# Patient Record
Sex: FEMALE
[De-identification: names, ages, dates, MRNs, and addresses within clinical notes are randomized per-mention and may not be internally consistent; named-entity substitution may affect disease eponyms.]

---

## 2021-06-29 ENCOUNTER — NON-APPOINTMENT (OUTPATIENT)
Age: 36
End: 2021-06-29

## 2021-06-30 PROBLEM — Z00.00 ENCOUNTER FOR PREVENTIVE HEALTH EXAMINATION: Status: ACTIVE | Noted: 2021-06-30

## 2021-07-09 ENCOUNTER — NON-APPOINTMENT (OUTPATIENT)
Age: 36
End: 2021-07-09

## 2021-07-12 ENCOUNTER — APPOINTMENT (OUTPATIENT)
Dept: BREAST CENTER | Facility: CLINIC | Age: 36
End: 2021-07-12
Payer: COMMERCIAL

## 2021-07-12 VITALS — HEART RATE: 65 BPM | BODY MASS INDEX: 23.14 KG/M2 | WEIGHT: 144 LBS | HEIGHT: 66 IN | OXYGEN SATURATION: 98 %

## 2021-07-12 DIAGNOSIS — Z78.9 OTHER SPECIFIED HEALTH STATUS: ICD-10-CM

## 2021-07-12 DIAGNOSIS — R92.8 OTHER ABNORMAL AND INCONCLUSIVE FINDINGS ON DIAGNOSTIC IMAGING OF BREAST: ICD-10-CM

## 2021-07-12 DIAGNOSIS — Z86.59 PERSONAL HISTORY OF OTHER MENTAL AND BEHAVIORAL DISORDERS: ICD-10-CM

## 2021-07-12 PROCEDURE — 99072 ADDL SUPL MATRL&STAF TM PHE: CPT

## 2021-07-12 PROCEDURE — 99203 OFFICE O/P NEW LOW 30 MIN: CPT

## 2021-07-12 RX ORDER — MULTIVIT-MIN/IRON/FOLIC ACID/K 18-600-40
CAPSULE ORAL
Refills: 0 | Status: ACTIVE | COMMUNITY

## 2021-07-13 NOTE — HISTORY OF PRESENT ILLNESS
[FreeTextEntry1] : 36 year old female who presents for initial evaluation regarding a family history of breast and ovarian cancer. Her mother was diagnosed with breast cancer at age 38 with a recurrence at age 58 and paternal grandmother diagnosed with ovarian cancer. Denies any breast pain or palpable abnormalities today. \par \par SCARLETT lifetime risk of 29.6%\par \par She underwent BRCA genetic testing with reportedly negative results in 03/2018. Patient is not aware if she had full panel testing. Will obtain those results and send them to us.\par \par Discussed patients high risk status and recommendations for close surveillance. Patient understands and would like to be followed closely.\par \par Discussed benefits of surveillance and well as implication of the sensitivity of breast MRI. Patient understands and agrees to go forward with MRI for breast cancer surveillance.\par \par Discussed self breast exams with the patient. Recommended simple pattern of palpation, while seated and while laying down. Recommended that she performs these breast exams at the same time every month, as to time it with her cycle. Discussed characteristics of a suspicious mass, such as irregular, hard like a rock and fixed/immobile. Patient understands.\par

## 2021-07-13 NOTE — DATA REVIEWED
[FreeTextEntry1] : 7/24/2020 ([facility]) MRI breasts: Bilateral nipple inversion, concordant with the stable mmg appearance of the breasts bilaterally. Suggestion of subcutaneous nonenhancing right axillary breast tissue. Right breast US is recommended directed to the right axilla for complete evaluation of aprtially included nonenhancing right axillary breast tissue; additional mgm views are also recommended. BI-RADS 0, right breast. BI-RADS 2 left breast. \par \par 8/13/2020 ([facility]) right breast mmg: No underlying mass or calcs identified. BI-RADS 2. \par \par 8/13/2020 ([facility]) targeted right breast US: Benign tissue. BI-RADS 2. \par \par 1/26/21 ([facility]) screening mmg: extremely dense. new calcs in the upper outer right breast. Rec 6-month f/u abreast MRI and addition right breast mmg views. BI-RADS 0.\par \par 1/26/21 ([facility]) b/l breast US: No suspicious findings.BI-RADS 2. 6-month f/u MRI is recommended. \par \par 3/1/21 ([facility]) right breast dx mmg: probably benign calcs within the anterior breast at 12:00, rec short interval f/u in 6 months w. mmg. BI-RADS 3.

## 2021-07-13 NOTE — PAST MEDICAL HISTORY
[Menstruating] : The patient is menstruating [Menarche Age ____] : age at menarche was [unfilled] [Definite ___ (Date)] : the last menstrual period was [unfilled] [Regular Cycle Intervals] : have been regular [Total Preg ___] : G[unfilled] [History of Hormone Replacement Treatment] : has no history of hormone replacement treatment [FreeTextEntry5] : None [FreeTextEntry6] : None [FreeTextEntry7] : None [FreeTextEntry8] : None

## 2021-07-19 ENCOUNTER — TRANSCRIPTION ENCOUNTER (OUTPATIENT)
Age: 36
End: 2021-07-19

## 2021-07-20 ENCOUNTER — RESULT REVIEW (OUTPATIENT)
Age: 36
End: 2021-07-20

## 2021-07-20 ENCOUNTER — APPOINTMENT (OUTPATIENT)
Dept: MRI IMAGING | Facility: CLINIC | Age: 36
End: 2021-07-20
Payer: COMMERCIAL

## 2021-07-20 ENCOUNTER — OUTPATIENT (OUTPATIENT)
Dept: OUTPATIENT SERVICES | Facility: HOSPITAL | Age: 36
LOS: 1 days | End: 2021-07-20

## 2021-07-20 PROCEDURE — 77049 MRI BREAST C-+ W/CAD BI: CPT | Mod: 26

## 2021-07-23 ENCOUNTER — NON-APPOINTMENT (OUTPATIENT)
Age: 36
End: 2021-07-23

## 2021-08-02 ENCOUNTER — NON-APPOINTMENT (OUTPATIENT)
Age: 36
End: 2021-08-02

## 2021-08-19 ENCOUNTER — NON-APPOINTMENT (OUTPATIENT)
Age: 36
End: 2021-08-19

## 2021-09-15 ENCOUNTER — APPOINTMENT (OUTPATIENT)
Dept: MAMMOGRAPHY | Facility: CLINIC | Age: 36
End: 2021-09-15
Payer: COMMERCIAL

## 2021-09-15 ENCOUNTER — OUTPATIENT (OUTPATIENT)
Dept: OUTPATIENT SERVICES | Facility: HOSPITAL | Age: 36
LOS: 1 days | End: 2021-09-15

## 2021-09-15 ENCOUNTER — RESULT REVIEW (OUTPATIENT)
Age: 36
End: 2021-09-15

## 2021-09-15 PROCEDURE — 77065 DX MAMMO INCL CAD UNI: CPT | Mod: 26,RT

## 2021-09-15 PROCEDURE — G0279: CPT | Mod: 26

## 2021-09-17 ENCOUNTER — NON-APPOINTMENT (OUTPATIENT)
Age: 36
End: 2021-09-17

## 2021-12-17 ENCOUNTER — APPOINTMENT (OUTPATIENT)
Dept: BREAST CENTER | Facility: CLINIC | Age: 36
End: 2021-12-17
Payer: COMMERCIAL

## 2021-12-17 VITALS — OXYGEN SATURATION: 98 % | HEART RATE: 71 BPM | HEIGHT: 66 IN

## 2021-12-17 PROCEDURE — 99213 OFFICE O/P EST LOW 20 MIN: CPT

## 2021-12-21 NOTE — DATA REVIEWED
[FreeTextEntry1] : 7/24/2020 ([facility]) MRI breasts: Bilateral nipple inversion, concordant with the stable mmg appearance of the breasts bilaterally. Suggestion of subcutaneous nonenhancing right axillary breast tissue. Right breast US is recommended directed to the right axilla for complete evaluation of aprtially included nonenhancing right axillary breast tissue; additional mgm views are also recommended. BI-RADS 0, right breast. BI-RADS 2 left breast. \par \par 8/13/2020 ([facility]) right breast mmg: No underlying mass or calcs identified. BI-RADS 2. \par \par 8/13/2020 ([facility]) targeted right breast US: Benign tissue. BI-RADS 2. \par \par 1/26/21 ([facility]) screening mmg: extremely dense. new calcs in the upper outer right breast. Rec 6-month f/u abreast MRI and addition right breast mmg views. BI-RADS 0.\par \par 1/26/21 ([facility]) b/l breast US: No suspicious findings.BI-RADS 2. 6-month f/u MRI is recommended. \par \par 3/1/21 ([facility]) right breast dx mmg: probably benign calcs within the anterior breast at 12:00, rec short interval f/u in 6 months w. mmg. BI-RADS 3. \par \par 7/10/21 ([facility]) MRI breasts: no suspicious enhancement. BI-RADS 1. \par \par 9/15/21 ([facility]) right dx mg: extremely dense. No e/o malignancy. BI-RADS 2.

## 2021-12-21 NOTE — ASSESSMENT
[FreeTextEntry1] : 37 yo female presents for high risk screening; no breast complaints today and her exam is WNL. Discussed with the patient the implications for their lifetime risk, which is considered to be elevated for the development of breast cancer over the span of their lifetime. It was explained that it is recommended that they undergo high risk screening surveillance to include biannual radiological screening exams with a mammogram and screening MRI. Reviewed the standard lifestyle modifications for risk reduction including to limit alcohol intake, follow a low-fat diet, and maintaining a healthy weight.

## 2021-12-21 NOTE — HISTORY OF PRESENT ILLNESS
[FreeTextEntry1] : 36 year old female who presents for high risk screening; Pt denies palpable masses, skin lesions/changes, nipple discharge, or other breast complaints. \par \par SCARLETT lifetime risk of 29.6% the patients mother was diagnosed with breast cancer at age 38 with a recurrence at age 58 and paternal grandmother diagnosed with ovarian cancer. Genetic testing was negative in August 2021.

## 2021-12-21 NOTE — PAST MEDICAL HISTORY
[Menstruating] : The patient is menstruating [Menarche Age ____] : age at menarche was [unfilled] [Regular Cycle Intervals] : have been regular [Total Preg ___] : G[unfilled] [History of Hormone Replacement Treatment] : has no history of hormone replacement treatment [FreeTextEntry5] : None [FreeTextEntry6] : None [FreeTextEntry7] : NuvaRing [FreeTextEntry8] : None

## 2022-01-04 ENCOUNTER — TRANSCRIPTION ENCOUNTER (OUTPATIENT)
Age: 37
End: 2022-01-04

## 2022-02-08 ENCOUNTER — RESULT REVIEW (OUTPATIENT)
Age: 37
End: 2022-02-08

## 2022-02-08 ENCOUNTER — APPOINTMENT (OUTPATIENT)
Dept: MAMMOGRAPHY | Facility: CLINIC | Age: 37
End: 2022-02-08
Payer: COMMERCIAL

## 2022-02-08 ENCOUNTER — OUTPATIENT (OUTPATIENT)
Dept: OUTPATIENT SERVICES | Facility: HOSPITAL | Age: 37
LOS: 1 days | End: 2022-02-08

## 2022-02-08 ENCOUNTER — APPOINTMENT (OUTPATIENT)
Dept: ULTRASOUND IMAGING | Facility: CLINIC | Age: 37
End: 2022-02-08
Payer: COMMERCIAL

## 2022-02-08 PROCEDURE — 76641 ULTRASOUND BREAST COMPLETE: CPT | Mod: 26,50

## 2022-02-08 PROCEDURE — 77063 BREAST TOMOSYNTHESIS BI: CPT | Mod: 26

## 2022-02-08 PROCEDURE — 77067 SCR MAMMO BI INCL CAD: CPT | Mod: 26

## 2022-02-10 ENCOUNTER — NON-APPOINTMENT (OUTPATIENT)
Age: 37
End: 2022-02-10

## 2022-05-03 ENCOUNTER — NON-APPOINTMENT (OUTPATIENT)
Age: 37
End: 2022-05-03

## 2022-07-12 ENCOUNTER — APPOINTMENT (OUTPATIENT)
Dept: MRI IMAGING | Facility: CLINIC | Age: 37
End: 2022-07-12

## 2022-07-12 ENCOUNTER — RESULT REVIEW (OUTPATIENT)
Age: 37
End: 2022-07-12

## 2022-07-12 ENCOUNTER — OUTPATIENT (OUTPATIENT)
Dept: OUTPATIENT SERVICES | Facility: HOSPITAL | Age: 37
LOS: 1 days | End: 2022-07-12

## 2022-07-12 PROCEDURE — 77049 MRI BREAST C-+ W/CAD BI: CPT | Mod: 26

## 2022-07-29 ENCOUNTER — APPOINTMENT (OUTPATIENT)
Dept: BREAST CENTER | Facility: CLINIC | Age: 37
End: 2022-07-29

## 2022-07-29 VITALS — HEIGHT: 66 IN | WEIGHT: 138 LBS | BODY MASS INDEX: 22.18 KG/M2 | OXYGEN SATURATION: 98 % | HEART RATE: 90 BPM

## 2022-07-29 DIAGNOSIS — Z80.41 FAMILY HISTORY OF MALIGNANT NEOPLASM OF OVARY: ICD-10-CM

## 2022-07-29 PROCEDURE — 99213 OFFICE O/P EST LOW 20 MIN: CPT

## 2022-07-29 RX ORDER — ETONOGESTREL AND ETHINYL ESTRADIOL .12; .015 MG/D; MG/D
0.12-0.015 INSERT, EXTENDED RELEASE VAGINAL
Qty: 3 | Refills: 0 | Status: ACTIVE | COMMUNITY
Start: 2022-07-06

## 2022-07-29 RX ORDER — UBIDECARENONE/VIT E ACET 100MG-5
CAPSULE ORAL
Refills: 0 | Status: ACTIVE | COMMUNITY

## 2022-07-29 RX ORDER — BIOTIN 10 MG
TABLET ORAL
Refills: 0 | Status: DISCONTINUED | COMMUNITY
End: 2022-07-29

## 2022-07-29 RX ORDER — CETIRIZINE HYDROCHLORIDE 10 MG/1
CAPSULE, LIQUID FILLED ORAL
Refills: 0 | Status: ACTIVE | COMMUNITY

## 2022-07-29 NOTE — PAST MEDICAL HISTORY
[Menstruating] : The patient is menstruating [Menarche Age ____] : age at menarche was [unfilled] [Regular Cycle Intervals] : have been regular [Total Preg ___] : G[unfilled] [Definite ___ (Date)] : the last menstrual period was [unfilled] [History of Hormone Replacement Treatment] : has no history of hormone replacement treatment [FreeTextEntry5] : None [FreeTextEntry6] : None [FreeTextEntry7] : NuvaRing [FreeTextEntry8] : None

## 2022-07-29 NOTE — PHYSICAL EXAM
[Normocephalic] : normocephalic [Atraumatic] : atraumatic [Supple] : supple [No Supraclavicular Adenopathy] : no supraclavicular adenopathy [Examined in the supine and seated position] : examined in the supine and seated position [Symmetrical] : symmetrical [No dominant masses] : no dominant masses left breast [No Nipple Retraction] : no left nipple retraction [No Nipple Discharge] : no left nipple discharge [No Axillary Lymphadenopathy] : no left axillary lymphadenopathy [No Edema] : no edema [No Rashes] : no rashes [No Ulceration] : no ulceration [Breast Nipple Inversion] : nipples not inverted [Breast Nipple Retraction] : nipples not retracted [Breast Nipple Flattening] : nipples not flattened [Breast Nipple Fissures] : nipples not fissured [de-identified] : right breast, 12:00 3cmFN, palpable density, mobile, approx 2cm, new from prior exam  [de-identified] : 1

## 2022-07-29 NOTE — HISTORY OF PRESENT ILLNESS
[FreeTextEntry1] : 37 year old female who presents for high risk screening; Pt denies palpable masses, skin lesions/changes, nipple discharge, or other breast complaints. \par \par SCARLETT lifetime risk of 29.6% the patients mother was diagnosed with breast cancer at age 38 with a recurrence at age 58 and paternal grandmother diagnosed with ovarian cancer. Genetic testing was negative in August 2021.

## 2022-07-29 NOTE — ASSESSMENT
[FreeTextEntry1] : 35 yo female presents for high risk screening; no breast complaints today; Exam is notable for palpable density at 12:00in her right breast. WIll have her proceed with diagnostic imaging. Discussed with the patient the implications for their lifetime risk, which is considered to be elevated for the development of breast cancer over the span of their lifetime. It was explained that it is recommended that they undergo high risk screening surveillance to include biannual radiological screening exams with a mammogram and screening MRI. \par \par Will have her proceed with a right diagnostic mammo and US to palpable density; if benign, she will be due for her routine high risk screening mammo & US in February & will RTC after. The patient verbalized understanding and is in agreement with the plan.

## 2022-07-29 NOTE — DATA REVIEWED
[FreeTextEntry1] : 7/24/2020 ([facility]) MRI breasts: Bilateral nipple inversion, concordant with the stable mmg appearance of the breasts bilaterally. Suggestion of subcutaneous nonenhancing right axillary breast tissue. Right breast US is recommended directed to the right axilla for complete evaluation of partially included nonenhancing right axillary breast tissue; additional mgm views are also recommended. BI-RADS 0, right breast. BI-RADS 2 left breast. \par \par 8/13/2020 ([facility]) right breast mmg: No underlying mass or calcs identified. BI-RADS 2. \par \par 8/13/2020 ([facility]) targeted right breast US: Benign tissue. BI-RADS 2. \par \par 1/26/21 ([facility]) screening mmg: extremely dense. new calcs in the upper outer right breast. Rec 6-month f/u abreast MRI and addition right breast mmg views. BI-RADS 0.\par \par 1/26/21 ([facility]) b/l breast US: No suspicious findings.BI-RADS 2. 6-month f/u MRI is recommended. \par \par 3/1/21 ([facility]) right breast dx mmg: probably benign calcs within the anterior breast at 12:00, rec short interval f/u in 6 months w. mmg. BI-RADS 3. \par \par 7/10/21 ([facility]) MRI breasts: no suspicious enhancement. BI-RADS 1. \par \par 9/15/21 ([facility]) right dx mg: extremely dense. No e/o malignancy. BI-RADS 2. \par \par 2/8/22 ([facility]) b/l mmg & US: extremely dense. No mammographic/sonographic evidence of malignancy. BI-RADS 2. \par \par 7/12/22 ([facility]) MRI: No e/o malignancy. BI-RADS 2.

## 2022-08-10 ENCOUNTER — RESULT REVIEW (OUTPATIENT)
Age: 37
End: 2022-08-10

## 2022-08-10 ENCOUNTER — APPOINTMENT (OUTPATIENT)
Dept: MAMMOGRAPHY | Facility: CLINIC | Age: 37
End: 2022-08-10

## 2022-08-10 ENCOUNTER — APPOINTMENT (OUTPATIENT)
Dept: ULTRASOUND IMAGING | Facility: CLINIC | Age: 37
End: 2022-08-10

## 2022-08-10 ENCOUNTER — OUTPATIENT (OUTPATIENT)
Dept: OUTPATIENT SERVICES | Facility: HOSPITAL | Age: 37
LOS: 1 days | End: 2022-08-10

## 2022-08-10 PROCEDURE — 76642 ULTRASOUND BREAST LIMITED: CPT | Mod: 26,RT

## 2022-08-10 PROCEDURE — G0279: CPT | Mod: 26

## 2022-08-10 PROCEDURE — 77065 DX MAMMO INCL CAD UNI: CPT | Mod: 26,RT

## 2022-08-11 ENCOUNTER — TRANSCRIPTION ENCOUNTER (OUTPATIENT)
Age: 37
End: 2022-08-11

## 2022-08-16 ENCOUNTER — TRANSCRIPTION ENCOUNTER (OUTPATIENT)
Age: 37
End: 2022-08-16

## 2023-02-14 ENCOUNTER — TRANSCRIPTION ENCOUNTER (OUTPATIENT)
Age: 38
End: 2023-02-14

## 2023-02-16 ENCOUNTER — OUTPATIENT (OUTPATIENT)
Dept: OUTPATIENT SERVICES | Facility: HOSPITAL | Age: 38
LOS: 1 days | End: 2023-02-16

## 2023-02-16 ENCOUNTER — APPOINTMENT (OUTPATIENT)
Dept: ULTRASOUND IMAGING | Facility: CLINIC | Age: 38
End: 2023-02-16
Payer: COMMERCIAL

## 2023-02-16 ENCOUNTER — RESULT REVIEW (OUTPATIENT)
Age: 38
End: 2023-02-16

## 2023-02-16 ENCOUNTER — TRANSCRIPTION ENCOUNTER (OUTPATIENT)
Age: 38
End: 2023-02-16

## 2023-02-16 ENCOUNTER — APPOINTMENT (OUTPATIENT)
Dept: MAMMOGRAPHY | Facility: CLINIC | Age: 38
End: 2023-02-16
Payer: COMMERCIAL

## 2023-02-16 PROCEDURE — 76641 ULTRASOUND BREAST COMPLETE: CPT | Mod: 26,50

## 2023-02-16 PROCEDURE — 77063 BREAST TOMOSYNTHESIS BI: CPT | Mod: 26

## 2023-02-16 PROCEDURE — 77067 SCR MAMMO BI INCL CAD: CPT | Mod: 26

## 2023-02-27 ENCOUNTER — NON-APPOINTMENT (OUTPATIENT)
Age: 38
End: 2023-02-27

## 2023-03-21 ENCOUNTER — APPOINTMENT (OUTPATIENT)
Dept: BREAST CENTER | Facility: CLINIC | Age: 38
End: 2023-03-21
Payer: COMMERCIAL

## 2023-03-21 VITALS
BODY MASS INDEX: 21.86 KG/M2 | HEART RATE: 60 BPM | DIASTOLIC BLOOD PRESSURE: 72 MMHG | HEIGHT: 66 IN | SYSTOLIC BLOOD PRESSURE: 103 MMHG | WEIGHT: 136 LBS | OXYGEN SATURATION: 100 %

## 2023-03-21 DIAGNOSIS — R92.2 INCONCLUSIVE MAMMOGRAM: ICD-10-CM

## 2023-03-21 PROCEDURE — 99213 OFFICE O/P EST LOW 20 MIN: CPT

## 2023-03-21 NOTE — ASSESSMENT
[FreeTextEntry1] : 36 yo female presents for high risk screening; no breast complaints today. Discussed with the patient the implications for their lifetime risk, which is considered to be elevated for the development of breast cancer over the span of their lifetime. It was explained that it is recommended that they undergo high risk screening surveillance to include biannual radiological screening exams with a mammogram and screening MRI. MRI will be due in July, will push to August for 6-month screening. If benign RTC in September. After that can do annual breast exams, alternating with GYN/PCP.

## 2023-03-21 NOTE — HISTORY OF PRESENT ILLNESS
[FreeTextEntry1] : 37 year old female who presents for high risk screening; Pt denies palpable masses, skin lesions/changes, nipple discharge, or other breast complaints. \par \par SCARLETT lifetime risk of 29.6% the patients mother was diagnosed with breast cancer at age 38 with a recurrence at age 58 and paternal grandmother diagnosed with ovarian cancer. Genetic testing was negative in August 2021.\par \par Patients has a dermatologist, PCP, and GYN who she follows up with routinely

## 2023-03-21 NOTE — DATA REVIEWED
[FreeTextEntry1] : 7/24/2020 ([facility]) MRI breasts: Bilateral nipple inversion, concordant with the stable mmg appearance of the breasts bilaterally. Suggestion of subcutaneous nonenhancing right axillary breast tissue. Right breast US is recommended directed to the right axilla for complete evaluation of partially included nonenhancing right axillary breast tissue; additional mgm views are also recommended. BI-RADS 0, right breast. BI-RADS 2 left breast. \par \par 8/13/2020 ([facility]) right breast mmg: No underlying mass or calcs identified. BI-RADS 2. \par \par 8/13/2020 ([facility]) targeted right breast US: Benign tissue. BI-RADS 2. \par \par 1/26/21 ([facility]) screening mmg: extremely dense. new calcs in the upper outer right breast. Rec 6-month f/u abreast MRI and addition right breast mmg views. BI-RADS 0.\par \par 1/26/21 ([facility]) b/l breast US: No suspicious findings.BI-RADS 2. 6-month f/u MRI is recommended. \par \par 3/1/21 ([facility]) right breast dx mmg: probably benign calcs within the anterior breast at 12:00, rec short interval f/u in 6 months w. mmg. BI-RADS 3. \par \par 7/10/21 ([facility]) MRI breasts: no suspicious enhancement. BI-RADS 1. \par \par 9/15/21 ([facility]) right dx mg: extremely dense. No e/o malignancy. BI-RADS 2. \par \par 2/8/22 ([facility]) b/l mmg & US: extremely dense. No mammographic/sonographic evidence of malignancy. BI-RADS 2. \par \par 7/12/22 ([facility]) MRI: No e/o malignancy. BI-RADS 2. \par \par 2/16/23 ([facility]) B/l mmg & US: extremely dense. No mammographic or sonographic evidence of malignancy. BI-RADS 2.

## 2023-03-21 NOTE — PHYSICAL EXAM
[Normocephalic] : normocephalic [Atraumatic] : atraumatic [Supple] : supple [No Supraclavicular Adenopathy] : no supraclavicular adenopathy [Examined in the supine and seated position] : examined in the supine and seated position [Symmetrical] : symmetrical [No dominant masses] : no dominant masses left breast [No Nipple Retraction] : no left nipple retraction [No Nipple Discharge] : no left nipple discharge [No Axillary Lymphadenopathy] : no left axillary lymphadenopathy [No Edema] : no edema [No Rashes] : no rashes [No Ulceration] : no ulceration [No dominant masses] : no dominant masses in right breast  [Breast Nipple Inversion] : nipples not inverted [Breast Nipple Retraction] : nipples not retracted [Breast Nipple Flattening] : nipples not flattened [Breast Nipple Fissures] : nipples not fissured [de-identified] : fibrocystic tissue, no dominant masses [de-identified] : previous palpable mass, as noted on prior exam, is no longer present/palpable

## 2023-08-01 ENCOUNTER — OUTPATIENT (OUTPATIENT)
Dept: OUTPATIENT SERVICES | Facility: HOSPITAL | Age: 38
LOS: 1 days | End: 2023-08-01
Payer: COMMERCIAL

## 2023-08-01 ENCOUNTER — APPOINTMENT (OUTPATIENT)
Dept: MRI IMAGING | Facility: CLINIC | Age: 38
End: 2023-08-01

## 2023-08-01 PROCEDURE — 77049 MRI BREAST C-+ W/CAD BI: CPT | Mod: 26

## 2023-08-07 ENCOUNTER — TRANSCRIPTION ENCOUNTER (OUTPATIENT)
Age: 38
End: 2023-08-07

## 2023-09-07 ENCOUNTER — NON-APPOINTMENT (OUTPATIENT)
Age: 38
End: 2023-09-07

## 2023-09-08 ENCOUNTER — APPOINTMENT (OUTPATIENT)
Dept: BREAST CENTER | Facility: CLINIC | Age: 38
End: 2023-09-08

## 2023-09-22 ENCOUNTER — APPOINTMENT (OUTPATIENT)
Dept: BREAST CENTER | Facility: CLINIC | Age: 38
End: 2023-09-22
Payer: COMMERCIAL

## 2023-09-22 VITALS
DIASTOLIC BLOOD PRESSURE: 77 MMHG | WEIGHT: 145 LBS | SYSTOLIC BLOOD PRESSURE: 113 MMHG | OXYGEN SATURATION: 99 % | BODY MASS INDEX: 23.3 KG/M2 | HEIGHT: 66 IN | HEART RATE: 76 BPM

## 2023-09-22 DIAGNOSIS — Z80.3 FAMILY HISTORY OF MALIGNANT NEOPLASM OF BREAST: ICD-10-CM

## 2023-09-22 PROCEDURE — 99213 OFFICE O/P EST LOW 20 MIN: CPT

## 2023-09-27 ENCOUNTER — TRANSCRIPTION ENCOUNTER (OUTPATIENT)
Age: 38
End: 2023-09-27

## 2023-10-17 ENCOUNTER — TRANSCRIPTION ENCOUNTER (OUTPATIENT)
Age: 38
End: 2023-10-17

## 2023-10-24 ENCOUNTER — TRANSCRIPTION ENCOUNTER (OUTPATIENT)
Age: 38
End: 2023-10-24

## 2023-11-22 ENCOUNTER — NON-APPOINTMENT (OUTPATIENT)
Age: 38
End: 2023-11-22

## 2023-11-30 ENCOUNTER — APPOINTMENT (OUTPATIENT)
Dept: BREAST CENTER | Facility: CLINIC | Age: 38
End: 2023-11-30
Payer: COMMERCIAL

## 2023-11-30 DIAGNOSIS — L72.0 EPIDERMAL CYST: ICD-10-CM

## 2023-11-30 DIAGNOSIS — N64.59 OTHER SIGNS AND SYMPTOMS IN BREAST: ICD-10-CM

## 2023-11-30 DIAGNOSIS — Z91.89 OTHER SPECIFIED PERSONAL RISK FACTORS, NOT ELSEWHERE CLASSIFIED: ICD-10-CM

## 2023-11-30 PROCEDURE — 99212 OFFICE O/P EST SF 10 MIN: CPT | Mod: 95

## 2024-01-26 ENCOUNTER — TRANSCRIPTION ENCOUNTER (OUTPATIENT)
Age: 39
End: 2024-01-26

## 2024-01-29 ENCOUNTER — TRANSCRIPTION ENCOUNTER (OUTPATIENT)
Age: 39
End: 2024-01-29

## 2024-02-23 ENCOUNTER — RESULT REVIEW (OUTPATIENT)
Age: 39
End: 2024-02-23

## 2024-02-23 ENCOUNTER — APPOINTMENT (OUTPATIENT)
Dept: MAMMOGRAPHY | Facility: CLINIC | Age: 39
End: 2024-02-23
Payer: COMMERCIAL

## 2024-02-23 ENCOUNTER — OUTPATIENT (OUTPATIENT)
Dept: OUTPATIENT SERVICES | Facility: HOSPITAL | Age: 39
LOS: 1 days | End: 2024-02-23

## 2024-02-23 ENCOUNTER — APPOINTMENT (OUTPATIENT)
Dept: ULTRASOUND IMAGING | Facility: CLINIC | Age: 39
End: 2024-02-23
Payer: COMMERCIAL

## 2024-02-23 PROCEDURE — 76641 ULTRASOUND BREAST COMPLETE: CPT | Mod: 26,50

## 2024-02-23 PROCEDURE — 77067 SCR MAMMO BI INCL CAD: CPT | Mod: 26

## 2024-02-23 PROCEDURE — 77063 BREAST TOMOSYNTHESIS BI: CPT | Mod: 26

## 2024-02-27 ENCOUNTER — TRANSCRIPTION ENCOUNTER (OUTPATIENT)
Age: 39
End: 2024-02-27

## 2024-08-02 ENCOUNTER — TRANSCRIPTION ENCOUNTER (OUTPATIENT)
Age: 39
End: 2024-08-02

## 2024-08-08 ENCOUNTER — OUTPATIENT (OUTPATIENT)
Dept: OUTPATIENT SERVICES | Facility: HOSPITAL | Age: 39
LOS: 1 days | End: 2024-08-08

## 2024-08-08 ENCOUNTER — APPOINTMENT (OUTPATIENT)
Dept: MRI IMAGING | Facility: CLINIC | Age: 39
End: 2024-08-08

## 2024-08-08 PROCEDURE — 77049 MRI BREAST C-+ W/CAD BI: CPT | Mod: 26

## 2024-08-09 ENCOUNTER — TRANSCRIPTION ENCOUNTER (OUTPATIENT)
Age: 39
End: 2024-08-09

## 2024-08-12 ENCOUNTER — TRANSCRIPTION ENCOUNTER (OUTPATIENT)
Age: 39
End: 2024-08-12

## 2024-09-13 ENCOUNTER — APPOINTMENT (OUTPATIENT)
Dept: BREAST CENTER | Facility: CLINIC | Age: 39
End: 2024-09-13
Payer: COMMERCIAL

## 2024-09-13 VITALS — HEIGHT: 66 IN | WEIGHT: 144 LBS | BODY MASS INDEX: 23.14 KG/M2

## 2024-09-13 DIAGNOSIS — Z91.89 OTHER SPECIFIED PERSONAL RISK FACTORS, NOT ELSEWHERE CLASSIFIED: ICD-10-CM

## 2024-09-13 DIAGNOSIS — N64.59 OTHER SIGNS AND SYMPTOMS IN BREAST: ICD-10-CM

## 2024-09-13 PROCEDURE — 99213 OFFICE O/P EST LOW 20 MIN: CPT

## 2024-09-13 NOTE — PAST MEDICAL HISTORY
[Menstruating] : The patient is menstruating [Menarche Age ____] : age at menarche was [unfilled] [Definite ___ (Date)] : the last menstrual period was [unfilled] [Regular Cycle Intervals] : have been regular [Total Preg ___] : G[unfilled] [History of Hormone Replacement Treatment] : has no history of hormone replacement treatment [FreeTextEntry5] : None [FreeTextEntry6] : None [FreeTextEntry7] : NuvaRing [FreeTextEntry8] : None

## 2024-09-13 NOTE — PLAN
[TextEntry] : Discussion with breast surgeon as noted above, we will contact the patient after to review recommendations Increase frequency of warm compresses Mammogram and ultrasound February Breast exam with GYN around January MRI in August 2025, RTC after or sooner pending above

## 2024-09-13 NOTE — DATA REVIEWED
[FreeTextEntry1] : 7/24/2020 ([facility]) MRI breasts: Bilateral nipple inversion, concordant with the stable mmg appearance of the breasts bilaterally. Suggestion of subcutaneous nonenhancing right axillary breast tissue. Right breast US is recommended directed to the right axilla for complete evaluation of partially included nonenhancing right axillary breast tissue; additional mgm views are also recommended. BI-RADS 0, right breast. BI-RADS 2 left breast.   8/13/2020 ([facility]) right breast mmg: No underlying mass or calcs identified. BI-RADS 2.   8/13/2020 ([facility]) targeted right breast US: Benign tissue. BI-RADS 2.   1/26/21 ([facility]) screening mmg: extremely dense. new calcs in the upper outer right breast. Rec 6-month f/u abreast MRI and addition right breast mmg views. BI-RADS 0.  1/26/21 ([facility]) b/l breast US: No suspicious findings.BI-RADS 2. 6-month f/u MRI is recommended.   3/1/21 ([facility]) right breast dx mmg: probably benign calcs within the anterior breast at 12:00, rec short interval f/u in 6 months w. mmg. BI-RADS 3.   7/10/21 ([facility]) MRI breasts: no suspicious enhancement. BI-RADS 1.   9/15/21 ([facility]) right dx mg: extremely dense. No e/o malignancy. BI-RADS 2.   2/8/22 ([facility]) b/l mmg & US: extremely dense. No mammographic/sonographic evidence of malignancy. BI-RADS 2.   7/12/22 ([facility]) MRI: No e/o malignancy. BI-RADS 2.   2/16/23 ([facility]) B/l mmg & US: extremely dense. No mammographic or sonographic evidence of malignancy. BI-RADS 2.   8/1/23 ([facility]) MRI: no evidence of malignancy. BI-RADS 2.

## 2024-09-13 NOTE — HISTORY OF PRESENT ILLNESS
[FreeTextEntry1] : 39 year old female who presents for high risk screening. Pt denies palpable masses, skin lesions/changes, nipple discharge,. She notes that there is a new small white nodule on her right nipple, which developed about 2 months ago. It is not causing pain; she had a similar lesion last year, which resolved with warm compresses. She has been applying warm compresses twice a week this Developed, which is not helping to decrease the size.  She also had a similar lesion many years ago, which was lanced in office and resolved immediately after.   SCARLETT lifetime risk of 29.6% the patients mother was diagnosed with breast cancer at age 38 with a recurrence at age 58 and paternal grandmother diagnosed with ovarian cancer. Genetic testing was negative in August 2021.  Patients has a dermatologist, PCP, and GYN who she follows up with routinely

## 2024-09-13 NOTE — ASSESSMENT
[FreeTextEntry1] : 40 yo female presents for high risk screening with a white nodule on her breas; this appears very similar to the prior white nipple lesion which resolved with warm compresses. It is not causing her pain; she denies nipple discharge. She is not pregnant, or have a history of pregnancy/breastfeeding.  Previous workup with prolactin and was within normal limits.  Advised patient to apply warm compresses more frequently, at least 5 days a week; will review case with one of the breast surgeons in the office to ensure surgical/procedural excision is not warranted.  I will call the patient after that discussion to review if any additional consultation or steps are needed.  Reviewed the results of her recent MRI which was benign; of note there was a prominent duct that was noted, however is not enhancing so there is no suspicion for malignancy at this time.  Reviewed [facility] high risk screening guidelines including the recommendation for biannual radiological screening exams with a mammogram and screening MRI. Her screening mammo/US is due in February, she will see her GYN around that time. If benign, will plan on MRI in August & RTC after for annual visit, or sooner pending above.  All questions were answered; the patient verbalized understanding and is in agreement with the plan.

## 2024-09-13 NOTE — PHYSICAL EXAM
[Normocephalic] : normocephalic [Atraumatic] : atraumatic [No Supraclavicular Adenopathy] : no supraclavicular adenopathy [Examined in the supine and seated position] : examined in the supine and seated position [Symmetrical] : symmetrical [No dominant masses] : no dominant masses in right breast  [No dominant masses] : no dominant masses left breast [No Nipple Retraction] : no left nipple retraction [No Nipple Discharge] : no left nipple discharge [No Axillary Lymphadenopathy] : no left axillary lymphadenopathy [No Edema] : no edema [No Rashes] : no rashes [No Ulceration] : no ulceration [No Cervical Adenopathy] : no cervical adenopathy [Breast Nipple Inversion] : nipples not inverted [Breast Nipple Retraction] : nipples not retracted [Breast Nipple Flattening] : nipples not flattened [Breast Nipple Fissures] : nipples not fissured [de-identified] : fibrocystic tissue, no dominant masses [de-identified] : subcentimeter superficial white dome shaped lesion on her nipple

## 2024-09-13 NOTE — REVIEW OF SYSTEMS
[As Noted in HPI] : as noted in HPI [Negative] : Heme/Lymph [Breast Pain] : no breast pain [de-identified] : nipple nodule

## 2024-09-17 ENCOUNTER — TRANSCRIPTION ENCOUNTER (OUTPATIENT)
Age: 39
End: 2024-09-17

## 2024-09-18 ENCOUNTER — TRANSCRIPTION ENCOUNTER (OUTPATIENT)
Age: 39
End: 2024-09-18

## 2025-02-24 ENCOUNTER — APPOINTMENT (OUTPATIENT)
Dept: MAMMOGRAPHY | Facility: CLINIC | Age: 40
End: 2025-02-24
Payer: COMMERCIAL

## 2025-02-24 ENCOUNTER — RESULT REVIEW (OUTPATIENT)
Age: 40
End: 2025-02-24

## 2025-02-24 ENCOUNTER — OUTPATIENT (OUTPATIENT)
Dept: OUTPATIENT SERVICES | Facility: HOSPITAL | Age: 40
LOS: 1 days | End: 2025-02-24

## 2025-02-24 ENCOUNTER — APPOINTMENT (OUTPATIENT)
Dept: ULTRASOUND IMAGING | Facility: CLINIC | Age: 40
End: 2025-02-24
Payer: COMMERCIAL

## 2025-02-24 PROCEDURE — 76641 ULTRASOUND BREAST COMPLETE: CPT | Mod: 26,50

## 2025-02-24 PROCEDURE — 77067 SCR MAMMO BI INCL CAD: CPT | Mod: 26

## 2025-02-24 PROCEDURE — 77063 BREAST TOMOSYNTHESIS BI: CPT | Mod: 26

## 2025-02-25 ENCOUNTER — TRANSCRIPTION ENCOUNTER (OUTPATIENT)
Age: 40
End: 2025-02-25

## 2025-08-15 ENCOUNTER — OUTPATIENT (OUTPATIENT)
Dept: OUTPATIENT SERVICES | Facility: HOSPITAL | Age: 40
LOS: 1 days | End: 2025-08-15

## 2025-08-15 ENCOUNTER — APPOINTMENT (OUTPATIENT)
Dept: MRI IMAGING | Facility: CLINIC | Age: 40
End: 2025-08-15
Payer: COMMERCIAL

## 2025-08-15 PROCEDURE — 77049 MRI BREAST C-+ W/CAD BI: CPT | Mod: 26

## 2025-08-19 ENCOUNTER — TRANSCRIPTION ENCOUNTER (OUTPATIENT)
Age: 40
End: 2025-08-19

## 2025-08-21 ENCOUNTER — NON-APPOINTMENT (OUTPATIENT)
Age: 40
End: 2025-08-21

## 2025-08-22 ENCOUNTER — APPOINTMENT (OUTPATIENT)
Dept: BREAST CENTER | Facility: CLINIC | Age: 40
End: 2025-08-22
Payer: COMMERCIAL

## 2025-08-22 VITALS
DIASTOLIC BLOOD PRESSURE: 66 MMHG | SYSTOLIC BLOOD PRESSURE: 94 MMHG | BODY MASS INDEX: 23.3 KG/M2 | HEIGHT: 66 IN | HEART RATE: 67 BPM | WEIGHT: 145 LBS

## 2025-08-22 DIAGNOSIS — R92.30 DENSE BREASTS, UNSPECIFIED: ICD-10-CM

## 2025-08-22 DIAGNOSIS — Z91.89 OTHER SPECIFIED PERSONAL RISK FACTORS, NOT ELSEWHERE CLASSIFIED: ICD-10-CM

## 2025-08-22 DIAGNOSIS — Z80.3 FAMILY HISTORY OF MALIGNANT NEOPLASM OF BREAST: ICD-10-CM

## 2025-08-22 PROCEDURE — 99213 OFFICE O/P EST LOW 20 MIN: CPT

## 2025-08-22 RX ORDER — FEXOFENADINE HCL 60 MG
TABLET ORAL
Refills: 0 | Status: ACTIVE | COMMUNITY